# Patient Record
Sex: FEMALE | Race: ASIAN | NOT HISPANIC OR LATINO | URBAN - METROPOLITAN AREA
[De-identification: names, ages, dates, MRNs, and addresses within clinical notes are randomized per-mention and may not be internally consistent; named-entity substitution may affect disease eponyms.]

---

## 2017-02-22 ENCOUNTER — TRANSFERRED RECORDS (OUTPATIENT)
Dept: HEALTH INFORMATION MANAGEMENT | Facility: CLINIC | Age: 67
End: 2017-02-22

## 2017-03-10 ENCOUNTER — TRANSFERRED RECORDS (OUTPATIENT)
Dept: HEALTH INFORMATION MANAGEMENT | Facility: CLINIC | Age: 67
End: 2017-03-10

## 2017-03-18 ENCOUNTER — TRANSFERRED RECORDS (OUTPATIENT)
Dept: HEALTH INFORMATION MANAGEMENT | Facility: CLINIC | Age: 67
End: 2017-03-18

## 2017-04-12 ENCOUNTER — TRANSFERRED RECORDS (OUTPATIENT)
Dept: HEALTH INFORMATION MANAGEMENT | Facility: CLINIC | Age: 67
End: 2017-04-12

## 2017-07-05 ENCOUNTER — TRANSFERRED RECORDS (OUTPATIENT)
Dept: HEALTH INFORMATION MANAGEMENT | Facility: CLINIC | Age: 67
End: 2017-07-05

## 2017-07-06 ENCOUNTER — TRANSFERRED RECORDS (OUTPATIENT)
Dept: HEALTH INFORMATION MANAGEMENT | Facility: CLINIC | Age: 67
End: 2017-07-06

## 2017-07-07 ENCOUNTER — TRANSFERRED RECORDS (OUTPATIENT)
Dept: HEALTH INFORMATION MANAGEMENT | Facility: CLINIC | Age: 67
End: 2017-07-07

## 2017-07-10 ENCOUNTER — TRANSFERRED RECORDS (OUTPATIENT)
Dept: HEALTH INFORMATION MANAGEMENT | Facility: CLINIC | Age: 67
End: 2017-07-10

## 2017-07-26 ENCOUNTER — PRE VISIT (OUTPATIENT)
Dept: RADIATION ONCOLOGY | Facility: CLINIC | Age: 67
End: 2017-07-26

## 2017-07-26 NOTE — TELEPHONE ENCOUNTER
1.  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  Referring provider: self-referred, requesting second opinion  3.  Call to patient (Yes / No - short description): no, Annemarie w/[facility] spoke to patient's daughter re: appts  4.  Previous care at / records requested from: [facility] (Annemarie faxed requests for medical records)

## 2021-03-12 ENCOUNTER — IMMUNIZATION (OUTPATIENT)
Dept: NURSING | Facility: CLINIC | Age: 71
End: 2021-03-12
Payer: COMMERCIAL

## 2021-03-12 PROCEDURE — 91300 PR COVID VAC PFIZER DIL RECON 30 MCG/0.3 ML IM: CPT

## 2021-03-12 PROCEDURE — 0001A PR COVID VAC PFIZER DIL RECON 30 MCG/0.3 ML IM: CPT

## 2021-03-12 PROCEDURE — T1013 SIGN LANG/ORAL INTERPRETER: HCPCS | Mod: GT

## 2021-04-02 ENCOUNTER — IMMUNIZATION (OUTPATIENT)
Dept: NURSING | Facility: CLINIC | Age: 71
End: 2021-04-02
Attending: FAMILY MEDICINE
Payer: COMMERCIAL

## 2021-04-02 PROCEDURE — 0002A PR COVID VAC PFIZER DIL RECON 30 MCG/0.3 ML IM: CPT

## 2021-04-02 PROCEDURE — 91300 PR COVID VAC PFIZER DIL RECON 30 MCG/0.3 ML IM: CPT

## 2021-04-02 PROCEDURE — T1013 SIGN LANG/ORAL INTERPRETER: HCPCS | Mod: GT

## 2021-04-04 ENCOUNTER — HEALTH MAINTENANCE LETTER (OUTPATIENT)
Age: 71
End: 2021-04-04

## 2021-09-19 ENCOUNTER — HEALTH MAINTENANCE LETTER (OUTPATIENT)
Age: 71
End: 2021-09-19

## 2022-05-01 ENCOUNTER — HEALTH MAINTENANCE LETTER (OUTPATIENT)
Age: 72
End: 2022-05-01

## 2022-11-21 ENCOUNTER — HEALTH MAINTENANCE LETTER (OUTPATIENT)
Age: 72
End: 2022-11-21

## 2023-04-16 ENCOUNTER — HEALTH MAINTENANCE LETTER (OUTPATIENT)
Age: 73
End: 2023-04-16

## 2023-06-02 ENCOUNTER — HEALTH MAINTENANCE LETTER (OUTPATIENT)
Age: 73
End: 2023-06-02

## 2023-07-12 DIAGNOSIS — M46.40 DISCITIS, UNSPECIFIED SPINAL REGION: Primary | ICD-10-CM

## 2023-07-12 DIAGNOSIS — B99.9 INFECTION: ICD-10-CM

## 2023-07-13 ENCOUNTER — HOME INFUSION (PRE-WILLOW HOME INFUSION) (OUTPATIENT)
Dept: PHARMACY | Facility: CLINIC | Age: 73
End: 2023-07-13
Payer: COMMERCIAL

## 2023-07-13 RX ORDER — CEFTRIAXONE 2 G/1
2 INJECTION, POWDER, FOR SOLUTION INTRAMUSCULAR; INTRAVENOUS EVERY 24 HOURS
Qty: 280 ML | Refills: 0
Start: 2023-07-13 | End: 2023-07-27

## 2023-07-13 NOTE — PROGRESS NOTES
Therapy: IV ABX (Ceftriaxone)  Insurance: [facility] [place]  Ded: $0.00  Met: $0.00    Co-Insurance: 100%  Max Out of Pocket: $0.00  Met: $0.00    The patient has coverage for IV ABX (Ceftriaxone)  and coverage should be 100%.    Please contact Intake with any questions, [phone] or In Basket pool, [facility] ([phone]).

## 2023-07-13 NOTE — PROGRESS NOTES
7/12/2023    Claudia Hamida    Received an inbasket message re: IV abx at [facility]--please see [facility] and last ID note by Dr. Wilkinson below.    Referral to home infusion made- care coordinator to evaluate and ID can enter orders once home care IV Ceftriaxone infusion arranged  Patient will also need ID follow up     7/12/2023 Note by [facility] LPN    Spoke with Ines Pavon  [phone], who is in charge of patient's care while at [facility]. [facility] only at [facility] for once daily IV ceftriaxone and is wanting to go home with home infusion services. Patient has lost weight - has hx nasopharangeal cancer, on a liquid diet and has limited food options at the [facility].      Insurance will cover home infusion through [facility]. Discussed with Dr. Hernandez briefly and will be able to put orders through [facility] system.      Dr. Hernandez, please advise if the 8/16 follow up is appropriate. May possibly be done with antibiotic then. Will notify daughter of appointment when confirmed.      [facility] p: [phone], f: [phone]         Karen Florence LPN..........7/12/2023 1:52 PM ([facility])      [facility] [11]     Ross Wilkinson MD  Physician  Specialty: Infectious Diseases     Progress Notes      Signed     Date of Service: 6/12/2023  9:08 AM     Signed         Service: Infectious Disease    Note: Follow Up Note   Date: 6/12/2023      ASSESSMENT:  1. C3-C4 Epidural abscess, S/P anterior cervical discectomy fusion on 6/7, GBS  2. Cultures- Group B Streptococcus, in process  3. Nasopharyngeal cancer hx, S/P chemo, radiation in 2019  4. Neck pain  5. Recent admission for chest pain  6. Allergies- Amoxil- hives        PLAN:     Only GBS growing from 6/8  Negative BC 6/7  IV CEFTRIAXONE is ordered under the DISCHARGE navigator tab, as are labs and FOLLOW UP     Sign off     BOOGIE Wilkinson MD     ________________________________________________________________________     Notes / labs / vitals  "reviewed.     SUBJECTIVE / INTERVAL HISTORY:  Stable.  Sleeping, I did not wake her up.    Now on CEFTRIAXONE alone.  DISCHARGE orders and medications are entered.         ROS: A complete 12 point ROS is negative except as listed above.     PMHx/FHx/SHx: Reviewed. Interval changes noted.           OBJECTIVE:  VS /90 (Cuff Size: Adult Regular)   Pulse 100   Temp 98.5  F (36.9  C)   Resp 18   Ht 1.499 m (4' 11\")   Wt 40.8 kg (90 lb)   SpO2 97%   BMI 18.18 kg/m       Temp (24hrs), Av.9  F (36.6  C), Min:96.8  F (36  C), Max:99.2  F (37.3  C)        GEN: resting.  Did not wake up  Neck stable, PICC in     Antibiotics:  CTX     Pertinent labs: reviewed              Recent Labs     23  0607 23  0011 23  2101 23  0649 06/10/23  0756 23  0603 23  1343 23  1806   WBC  --   --   --  13.7 H 16.3 H 10.9 10.6 13.0 H   HGB 13.2 13.4 13.3 13.6 13.6 14.1 13.3 12.0   HCT  --   --   --  40.0 40.8 44.2 42.1 36.8   PLT  --   --   --  255 278 263 274 168                 Recent Labs     23  0607 23  0649 06/10/23  0756 23  0603 23  1343   CREATININE 0.47 L 0.51 0.52 0.66 0.83             Recent Labs     06/10/23  0756   ALT 11   AST 37 H   BILITOTAL 0.5   ALKPHOSPH 94         MICROBIOLOGY DATA:  Reviewed culture results- Group B Strep  Reviewed aerobic, anaerobic and blood culture results             Microbiology Results (72 HOURS)      Procedure Component Value Units Date/Time     ANAEROBIC CULTURE [[id number]] Collected: 23 1314     Lab Status: In process Specimen: Cervical Swab Updated: 23 1437     AEROBIC BACTERIAL CULTURE, STAIN [[id number]] Collected: 23 1314     Lab Status: Preliminary result Specimen: Cervical Swab Updated: 23 1811       GRAM STAIN                1+ PMNs         No Epithelial cells         2+ RBCs         No organisms seen         Gram stain performed by [facility], [place]     BLOOD CULTURE " [[id number]]  (Normal) Collected: 06/07/23 2202     Lab Status: Preliminary result Specimen: Blood Updated: 06/08/23 0206       CULTURE No growth to date.     BLOOD CULTURE [[id number]]  (Normal) Collected: 06/07/23 2201     Lab Status: Preliminary result Specimen: Blood Updated: 06/08/23 0206       CULTURE No growth to date.             IMAGING/RADIOLOGY:  Reviewed imaging results  XR C-ARM GREATER 1 HR     Result Date: 6/8/2023  For Patients: As a result of the 21st Century Cures Act, medical imaging exams and procedure reports are released immediately into your electronic medical record. You may view this report before your referring provider. If you have questions, please contact your health care provider. EXAM: XR C-ARM GREATER 1 HR LOCATION: [facility] MEDICAL IMAGING DATE/TIME: 6/8/2023 1:46 PM CDT INDICATION: Image guided surgery COMPARISON: Cervical spine CT 06/07/2023 TECHNIQUE: Intraoperative fluoroscopy performed during the patient's procedure. FLUOROSCOPIC TIME: 20 sec 30 sec NUMBER OF IMAGES: 2 FINDINGS: Interval C3-C4 anterior cervical discectomy and fusion. Instrumentation is in good position. Anatomic alignment.     MR C-Spine W/WO Contrast if Prvious Surg     Result Date: 6/7/2023  For Patients: As a result of the 21st Century Cures Act, medical imaging exams and procedure reports are released immediately into your electronic medical record. You may view this report before your referring provider. If you have questions, please contact your health care provider. EXAM: MR SPINE CERVICAL WWO LOCATION: [facility] MEDICAL IMAGING DATE/TIME: 6/7/2023 9:20 PM CDT INDICATION: neck pain, see CT findings, hx of facial Cancer COMPARISON: None. CONTRAST: Gadavist 4 TECHNIQUE: MRI Cervical Spine without and with IV contrast. FINDINGS: Normal craniocervical and atlantoaxial alignment. Straightening of cervical lordosis. Marrow T2 STIR hyperintensity and enhancement throughout the C3 and C4 vertebral bodies. T2 STIR  hyperintensity and enhancement within the posterior intervertebral disc. Rim-enhancing fluid collection in the ventral epidural space measures 19 x 7 mm. Right lateral and dorsolateral epidural thickening extends cephalad to the craniocervical junction and caudally to the level of C5. The abscess compresses the cervical spinal cord at the level of C3-C4 with the spinal canal measuring as narrow as 6 mm. Abnormal cord T2 hyperintensity extends from the level of C1-C2 level of C5. At the level of C1-C2 there is abnormal T2 hyperintensity in the central and right dorsolateral cord left dorsolateral cord T2 hyperintensity is seen at the level of C2-C3 through the level of C4-C5. At the level of C5, central and midline dorsal cord T2 hyperintensity. No abnormal cord enhancement. T2 STIR hyperintensity and enhancement in the prevertebral and retropharyngeal space anterior to the discitis osteomyelitis is favored to represent phlegmon/effusion. No rim-enhancing collection to suggest prevertebral/retropharyngeal abscess. Multilevel cervical spondylosis. At the level of C4-C5, central disc protrusion and ventral epidural phlegmon contribute to moderate to severe spinal canal stenosis. Central disc protrusion at C6-C7 flattens the ventral spinal cord with faint ventral cord T2 hyperintensity and only mild spinal canal stenosis. No additional significant spinal canal stenosis. No high-grade neural foraminal stenosis.      1.  C3-C4 discitis-osteomyelitis with associated ventral epidural abscess that causes severe spinal canal stenosis compresses the ventral spinal cord. Abnormal cord signal (6 without abnormal cord enhancement) extends from the level of C1-C2 through the level of C5. Recommend neurosurgical consultation. 2.  Probable retropharyngeal effusion and prevertebral phlegmon anterior to the level of discitis-osteomyelitis without finding to suggest retropharyngeal/prevertebral abscess. 3.  No additional high-grade  spinal canal or neural foraminal stenosis. 4.  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      CT NECK SOFT TISSUE W     Result Date: 6/7/2023  For Patients: As a result of the 21st Century Cures Act, medical imaging exams and procedure reports are released immediately into your electronic medical record. You may view this report before your referring provider. If you have questions, please contact your health care provider. EXAM: CT NECK SOFT TISSUE W LOCATION: [facility] MEDICAL IMAGING DATE/TIME: 6/7/2023 5:40 PM CDT INDICATION: neck pain, swelling ,hx of CA COMPARISON: 04/20/2020 CONTRAST: IOHEXOL 350 MG IODINE/ML  ML BOTTLE: 100mL TECHNIQUE: Routine CT Soft Tissue Neck with IV contrast. Multiplanar reformats. Dose reduction techniques were used. FINDINGS: MUCOSAL SPACES/SOFT TISSUES: Slight asymmetry in the appearance of nasopharyngeal tissues with likely prominent and tissues on the right (series 3 image 25). This is similar to prior exam, allowing for differences in technique (series 3 image 19 on the prior). No definite other asymmetry.. Normal vocal cords and infraglottic trachea. Normal parapharyngeal space and subcutaneous soft tissues. Normal oral cavity,  spaces, and floor of mouth structures. LYMPH NODES: No pathologic lymph nodes by size or morphology criteria. SALIVARY GLANDS: Fatty replacement of parotid glands. Mild inflammatory changes in submandibular space, nonspecific. Submandibular glands appear normal. THYROID: Normal. VESSELS: Vascular structures of the neck are patent. VISUALIZED INTRACRANIAL/ORBITS/SINUSES: No abnormality of the visualized intracranial compartment or orbits. Moderate mucosal thickening maxillary sinuses with underlying postoperative changes. Moderate mucosal thickening sphenoid sinuses. Fluid right maxillary and  right sphenoid sinus. Opacification mastoid air cells and middle ear cavities bilaterally. OTHER: Epidural low-density surrounding C3-C4 intervertebral space measuring 6 mm in thickness and extending from C2-C3 through C4-C5 level. It creates moderate to marked canal narrowing with likely mass effect on the cord. This probably represents disc extrusion however epidural collection cannot be excluded. Is not well-visualized with CT soft tissue neck. MRI cervical spine without and with contrast recommended. The included lung apices are clear.      1.  Epidural low-density surrounding C3-C4 intervertebral space measuring 6 mm in thickness and extending from C2-C3 through C4-C5 level. It creates moderate to marked canal narrowing with likely mass effect on the cord. This probably represents disc extrusion however epidural collection cannot be excluded. Is not well-visualized with CT soft tissue neck. MRI cervical spine without and with contrast recommended. 2.  Slight asymmetry in the appearance of nasopharyngeal tissues with likely prominent and tissues on the right. This is similar to prior exam, allowing for differences in technique. 3.  No definite other asymmetry in soft tissues throughout the neck. 4.  Mild inflammatory changes in submandibular space, nonspecific. Submandibular glands appear normal. 5.  Moderate mucosal thickening maxillary sinuses with underlying postoperative changes. Moderate mucosal thickening sphenoid sinuses. Fluid right maxillary and right sphenoid sinus. 6.  Opacification mastoid air cells and middle ear cavities bilaterally.      CT SPINE CERVICAL WO     Result Date: 6/7/2023  For Patients: As a result of the 21st Century Cures Act, medical imaging exams and procedure reports are released immediately into your electronic medical record. You may view this report before your referring provider. If you have questions, please contact your health care provider. EXAM: CT SPINE CERVICAL WO  LOCATION: [facility] MEDICAL IMAGING DATE/TIME: 6/7/2023 3:55 PM CDT INDICATION: Diffuse posterior neck pain. Lymphoepthelial. Carcinoma. COMPARISON: None. TECHNIQUE: Routine CT Cervical Spine without IV contrast. Multiplanar reformats. Dose reduction techniques were used. FINDINGS: VERTEBRA: Normal vertebral body heights. Reversed lordosis. No fracture or post traumatic subluxation. Incidental C2 intraosseous hemangioma. CANAL/FORAMINA: Moderate right C5-C6 neural foraminal stenoses due to chronic degeneration. No spinal canal stenosis. PARASPINAL: Diffuse loss of prevertebral and deep cervical fat planes . Edematous appearance of the epiglottis. Changes may be from prior radiation therapy.      1.  No fracture or posttraumatic subluxation. 2.  Moderate right C5-C6 neural foraminal stenoses due to chronic degeneration. 3.  Diffuse loss of prevertebral and deep cervical fat planes and edematous appearance of the epiglottis. Changes may be from prior radiation therapy. 4.  Consider further assessment with a contrast-enhanced soft tissue neck CT if there are clinical concerns for an active soft tissue process.     XR CHEST 2 VIEWS PA AND LATERAL     Result Date: 5/22/2023  For Patients: As a result of the 21st Century Cures Act, medical imaging exams and procedure reports are released immediately into your electronic medical record. You may view this report before your referring provider. If you have questions, please contact your health care provider. EXAM: XR CHEST 2 VIEWS PA AND LATERAL LOCATION: [facility] MEDICAL IMAGING DATE/TIME: 5/22/2023 5:04 PM CDT INDICATION: Chest pain SOB COMPARISON: None.      Chronic biapical scarring. Lungs are otherwise clear. No pleural effusion. Heart size and pulmonary vascularity within normal limits. Bones are demineralized.     DIAGNOSES  []? Minor/self-limited problem  []? Stable chronic illness  []? Acute uncomplicated illness/injury   []? Chronic illness with exacerbation/progression  []?  New problem with uncertain prognosis  [x]? Acute systemic illness  []? Problem with uncertain diagnosis  []? Chronic illness with severe exacerbation/progression  [x]? Problem that poses threat to life or bodily function      REVIEW OF DATA   I have  []? Reviewed/ordered []? 1 []? 2 [x]? ? 3 unique laboratory, radiology, and/or diagnostic tests     []? Reviewed []? 1 []? 2 [x]? ? 3 prior external notes and incorporated into patient assessment      [x]? Discussed management or test interpretation with external provider(s) on 06/12/23     RISK OF COMPLICATION   This writer has deemed the above diagnoses to have a risk of complication, morbidity or mortality of:   []? Minimal  []? Low  []? Moderate   []?Due to prescription drug management   []?Decision regarding minor surgery   []?Diagnosis or treatment limited by social determinants of health  [x]? Severe    [x]?Due to intensive monitoring for therapy toxicity   []?Decision elective surgery with risk factors  [x]?Decision regarding need for hospitalization  []?Advanced Care Planning decisions     I spent 50 minutes counseling and coordinating care for the items checked below on the day of service  [x]? Preparing to see the patient (e.g., review of tests)  [x]? Obtaining and or reviewing separately obtained history   [x]? Performing a medically appropriate examination and/or evaluation   [x]? Counseling and educating the patient/family/caregiver   [x]? Ordering medications, tests, or procedures  []? Referring and communicating with other healthcare professionals (when not separately reported)  [x]? Documenting clinical information in the EHR  [x]? Independently interpreting results and communicating results to patient/ family/caregiver

## 2023-07-20 ENCOUNTER — LAB REQUISITION (OUTPATIENT)
Dept: LAB | Facility: CLINIC | Age: 73
End: 2023-07-20
Payer: COMMERCIAL

## 2023-07-20 DIAGNOSIS — G06.1 INTRASPINAL ABSCESS AND GRANULOMA: ICD-10-CM

## 2023-07-20 LAB
ALBUMIN SERPL BCG-MCNC: 3.8 G/DL (ref 3.5–5.2)
ALP SERPL-CCNC: 121 U/L (ref 35–104)
ALT SERPL W P-5'-P-CCNC: 9 U/L (ref 0–50)
AST SERPL W P-5'-P-CCNC: 29 U/L (ref 0–45)
BASOPHILS # BLD AUTO: 0 10E3/UL (ref 0–0.2)
BASOPHILS NFR BLD AUTO: 0 %
BILIRUB DIRECT SERPL-MCNC: <0.2 MG/DL (ref 0–0.3)
BILIRUB SERPL-MCNC: 0.5 MG/DL
CREAT SERPL-MCNC: 0.88 MG/DL (ref 0.51–0.95)
CRP SERPL-MCNC: 70.5 MG/L
EOSINOPHIL # BLD AUTO: 0 10E3/UL (ref 0–0.7)
EOSINOPHIL NFR BLD AUTO: 1 %
ERYTHROCYTE [DISTWIDTH] IN BLOOD BY AUTOMATED COUNT: 16.2 % (ref 10–15)
GFR SERPL CREATININE-BSD FRML MDRD: 70 ML/MIN/1.73M2
HCT VFR BLD AUTO: 38.7 % (ref 35–47)
HGB BLD-MCNC: 12 G/DL (ref 11.7–15.7)
IMM GRANULOCYTES # BLD: 0 10E3/UL
IMM GRANULOCYTES NFR BLD: 0 %
LYMPHOCYTES # BLD AUTO: 0.5 10E3/UL (ref 0.8–5.3)
LYMPHOCYTES NFR BLD AUTO: 9 %
MCH RBC QN AUTO: 28.8 PG (ref 26.5–33)
MCHC RBC AUTO-ENTMCNC: 31 G/DL (ref 31.5–36.5)
MCV RBC AUTO: 93 FL (ref 78–100)
MONOCYTES # BLD AUTO: 0.6 10E3/UL (ref 0–1.3)
MONOCYTES NFR BLD AUTO: 10 %
NEUTROPHILS # BLD AUTO: 4.9 10E3/UL (ref 1.6–8.3)
NEUTROPHILS NFR BLD AUTO: 80 %
NRBC # BLD AUTO: 0 10E3/UL
NRBC BLD AUTO-RTO: 0 /100
PLATELET # BLD AUTO: 165 10E3/UL (ref 150–450)
PROT SERPL-MCNC: 7.2 G/DL (ref 6.4–8.3)
RBC # BLD AUTO: 4.16 10E6/UL (ref 3.8–5.2)
WBC # BLD AUTO: 6 10E3/UL (ref 4–11)

## 2023-07-20 PROCEDURE — 86140 C-REACTIVE PROTEIN: CPT | Performed by: INTERNAL MEDICINE

## 2023-07-20 PROCEDURE — 82565 ASSAY OF CREATININE: CPT | Performed by: INTERNAL MEDICINE

## 2023-07-20 PROCEDURE — 80076 HEPATIC FUNCTION PANEL: CPT | Performed by: INTERNAL MEDICINE

## 2023-07-20 PROCEDURE — 85025 COMPLETE CBC W/AUTO DIFF WBC: CPT | Performed by: INTERNAL MEDICINE

## 2023-07-27 ENCOUNTER — LAB REQUISITION (OUTPATIENT)
Dept: LAB | Facility: CLINIC | Age: 73
End: 2023-07-27
Payer: COMMERCIAL

## 2023-07-27 DIAGNOSIS — G06.1 INTRASPINAL ABSCESS AND GRANULOMA: ICD-10-CM

## 2023-07-27 LAB
ALBUMIN SERPL BCG-MCNC: 4 G/DL (ref 3.5–5.2)
ALP SERPL-CCNC: 92 U/L (ref 35–104)
ALT SERPL W P-5'-P-CCNC: <5 U/L (ref 0–50)
AST SERPL W P-5'-P-CCNC: 31 U/L (ref 0–45)
BASOPHILS # BLD AUTO: 0 10E3/UL (ref 0–0.2)
BASOPHILS NFR BLD AUTO: 1 %
BILIRUB DIRECT SERPL-MCNC: <0.2 MG/DL (ref 0–0.3)
BILIRUB SERPL-MCNC: 0.3 MG/DL
CREAT SERPL-MCNC: 0.82 MG/DL (ref 0.51–0.95)
CRP SERPL-MCNC: 8.1 MG/L
EOSINOPHIL # BLD AUTO: 0.1 10E3/UL (ref 0–0.7)
EOSINOPHIL NFR BLD AUTO: 2 %
ERYTHROCYTE [DISTWIDTH] IN BLOOD BY AUTOMATED COUNT: 16.4 % (ref 10–15)
FASTING STATUS PATIENT QL REPORTED: NORMAL
GFR SERPL CREATININE-BSD FRML MDRD: 77 ML/MIN/1.73M2
HCT VFR BLD AUTO: 37.7 % (ref 35–47)
HGB BLD-MCNC: 11.9 G/DL (ref 11.7–15.7)
IMM GRANULOCYTES # BLD: 0 10E3/UL
IMM GRANULOCYTES NFR BLD: 0 %
LYMPHOCYTES # BLD AUTO: 0.7 10E3/UL (ref 0.8–5.3)
LYMPHOCYTES NFR BLD AUTO: 12 %
MCH RBC QN AUTO: 29.5 PG (ref 26.5–33)
MCHC RBC AUTO-ENTMCNC: 31.6 G/DL (ref 31.5–36.5)
MCV RBC AUTO: 94 FL (ref 78–100)
MONOCYTES # BLD AUTO: 0.3 10E3/UL (ref 0–1.3)
MONOCYTES NFR BLD AUTO: 5 %
NEUTROPHILS # BLD AUTO: 4.5 10E3/UL (ref 1.6–8.3)
NEUTROPHILS NFR BLD AUTO: 80 %
NRBC # BLD AUTO: 0 10E3/UL
NRBC BLD AUTO-RTO: 0 /100
PLATELET # BLD AUTO: 204 10E3/UL (ref 150–450)
PROT SERPL-MCNC: 7.1 G/DL (ref 6.4–8.3)
RBC # BLD AUTO: 4.03 10E6/UL (ref 3.8–5.2)
TRIGL SERPL-MCNC: 149 MG/DL
WBC # BLD AUTO: 5.5 10E3/UL (ref 4–11)

## 2023-07-27 PROCEDURE — 86140 C-REACTIVE PROTEIN: CPT | Performed by: INTERNAL MEDICINE

## 2023-07-27 PROCEDURE — 84478 ASSAY OF TRIGLYCERIDES: CPT | Performed by: INTERNAL MEDICINE

## 2023-07-27 PROCEDURE — 85049 AUTOMATED PLATELET COUNT: CPT | Performed by: INTERNAL MEDICINE

## 2023-07-27 PROCEDURE — 82565 ASSAY OF CREATININE: CPT | Performed by: INTERNAL MEDICINE

## 2023-07-27 PROCEDURE — 80076 HEPATIC FUNCTION PANEL: CPT | Performed by: INTERNAL MEDICINE

## 2023-08-03 ENCOUNTER — LAB REQUISITION (OUTPATIENT)
Dept: LAB | Facility: CLINIC | Age: 73
End: 2023-08-03
Payer: COMMERCIAL

## 2023-08-03 DIAGNOSIS — G06.1 INTRASPINAL ABSCESS AND GRANULOMA: ICD-10-CM

## 2023-08-03 LAB
ALBUMIN SERPL BCG-MCNC: 4 G/DL (ref 3.5–5.2)
ALP SERPL-CCNC: 87 U/L (ref 35–104)
ALT SERPL W P-5'-P-CCNC: 9 U/L (ref 0–50)
AST SERPL W P-5'-P-CCNC: 34 U/L (ref 0–45)
BASOPHILS # BLD AUTO: 0 10E3/UL (ref 0–0.2)
BASOPHILS NFR BLD AUTO: 1 %
BILIRUB DIRECT SERPL-MCNC: <0.2 MG/DL (ref 0–0.3)
BILIRUB SERPL-MCNC: 0.2 MG/DL
CREAT SERPL-MCNC: 0.77 MG/DL (ref 0.51–0.95)
CRP SERPL-MCNC: 5.55 MG/L
EOSINOPHIL # BLD AUTO: 0.1 10E3/UL (ref 0–0.7)
EOSINOPHIL NFR BLD AUTO: 2 %
ERYTHROCYTE [DISTWIDTH] IN BLOOD BY AUTOMATED COUNT: 16.7 % (ref 10–15)
GFR SERPL CREATININE-BSD FRML MDRD: 83 ML/MIN/1.73M2
HCT VFR BLD AUTO: 39.1 % (ref 35–47)
HGB BLD-MCNC: 12.1 G/DL (ref 11.7–15.7)
IMM GRANULOCYTES # BLD: 0 10E3/UL
IMM GRANULOCYTES NFR BLD: 0 %
LYMPHOCYTES # BLD AUTO: 0.6 10E3/UL (ref 0.8–5.3)
LYMPHOCYTES NFR BLD AUTO: 10 %
MCH RBC QN AUTO: 29.4 PG (ref 26.5–33)
MCHC RBC AUTO-ENTMCNC: 30.9 G/DL (ref 31.5–36.5)
MCV RBC AUTO: 95 FL (ref 78–100)
MONOCYTES # BLD AUTO: 0.3 10E3/UL (ref 0–1.3)
MONOCYTES NFR BLD AUTO: 5 %
NEUTROPHILS # BLD AUTO: 4.9 10E3/UL (ref 1.6–8.3)
NEUTROPHILS NFR BLD AUTO: 82 %
NRBC # BLD AUTO: 0 10E3/UL
NRBC BLD AUTO-RTO: 0 /100
PLATELET # BLD AUTO: 203 10E3/UL (ref 150–450)
PROT SERPL-MCNC: 6.9 G/DL (ref 6.4–8.3)
RBC # BLD AUTO: 4.11 10E6/UL (ref 3.8–5.2)
WBC # BLD AUTO: 6 10E3/UL (ref 4–11)

## 2023-08-03 PROCEDURE — 80076 HEPATIC FUNCTION PANEL: CPT | Performed by: INTERNAL MEDICINE

## 2023-08-03 PROCEDURE — 82565 ASSAY OF CREATININE: CPT | Performed by: INTERNAL MEDICINE

## 2023-08-03 PROCEDURE — 86140 C-REACTIVE PROTEIN: CPT | Performed by: INTERNAL MEDICINE

## 2023-08-03 PROCEDURE — 85041 AUTOMATED RBC COUNT: CPT | Performed by: INTERNAL MEDICINE

## 2023-08-03 PROCEDURE — 85014 HEMATOCRIT: CPT | Performed by: INTERNAL MEDICINE

## 2023-08-04 ENCOUNTER — APPOINTMENT (OUTPATIENT)
Dept: INTERPRETER SERVICES | Facility: CLINIC | Age: 73
End: 2023-08-04
Payer: COMMERCIAL

## 2023-08-10 ENCOUNTER — LAB REQUISITION (OUTPATIENT)
Dept: LAB | Facility: CLINIC | Age: 73
End: 2023-08-10
Payer: COMMERCIAL

## 2023-08-10 DIAGNOSIS — G06.1 INTRASPINAL ABSCESS AND GRANULOMA: ICD-10-CM

## 2023-08-10 LAB
ALBUMIN SERPL BCG-MCNC: 4.1 G/DL (ref 3.5–5.2)
ALP SERPL-CCNC: 87 U/L (ref 35–104)
ALT SERPL W P-5'-P-CCNC: 10 U/L (ref 0–50)
AST SERPL W P-5'-P-CCNC: 38 U/L (ref 0–45)
BASOPHILS # BLD AUTO: 0 10E3/UL (ref 0–0.2)
BASOPHILS NFR BLD AUTO: 1 %
BILIRUB DIRECT SERPL-MCNC: <0.2 MG/DL (ref 0–0.3)
BILIRUB SERPL-MCNC: 0.2 MG/DL
CREAT SERPL-MCNC: 0.85 MG/DL (ref 0.51–0.95)
CRP SERPL-MCNC: <3 MG/L
EOSINOPHIL # BLD AUTO: 0.1 10E3/UL (ref 0–0.7)
EOSINOPHIL NFR BLD AUTO: 2 %
ERYTHROCYTE [DISTWIDTH] IN BLOOD BY AUTOMATED COUNT: 16.9 % (ref 10–15)
GFR SERPL CREATININE-BSD FRML MDRD: 73 ML/MIN/1.73M2
HCT VFR BLD AUTO: 38.9 % (ref 35–47)
HGB BLD-MCNC: 12.4 G/DL (ref 11.7–15.7)
HOLD SPECIMEN: NORMAL
IMM GRANULOCYTES # BLD: 0 10E3/UL
IMM GRANULOCYTES NFR BLD: 0 %
LYMPHOCYTES # BLD AUTO: 0.6 10E3/UL (ref 0.8–5.3)
LYMPHOCYTES NFR BLD AUTO: 12 %
MCH RBC QN AUTO: 29.9 PG (ref 26.5–33)
MCHC RBC AUTO-ENTMCNC: 31.9 G/DL (ref 31.5–36.5)
MCV RBC AUTO: 94 FL (ref 78–100)
MONOCYTES # BLD AUTO: 0.3 10E3/UL (ref 0–1.3)
MONOCYTES NFR BLD AUTO: 6 %
NEUTROPHILS # BLD AUTO: 3.8 10E3/UL (ref 1.6–8.3)
NEUTROPHILS NFR BLD AUTO: 79 %
NRBC # BLD AUTO: 0 10E3/UL
NRBC BLD AUTO-RTO: 0 /100
PLATELET # BLD AUTO: 186 10E3/UL (ref 150–450)
PROT SERPL-MCNC: 6.9 G/DL (ref 6.4–8.3)
RBC # BLD AUTO: 4.15 10E6/UL (ref 3.8–5.2)
WBC # BLD AUTO: 4.8 10E3/UL (ref 4–11)

## 2023-08-10 PROCEDURE — 86140 C-REACTIVE PROTEIN: CPT | Performed by: INTERNAL MEDICINE

## 2023-08-10 PROCEDURE — 80076 HEPATIC FUNCTION PANEL: CPT | Performed by: INTERNAL MEDICINE

## 2023-08-10 PROCEDURE — 82565 ASSAY OF CREATININE: CPT | Performed by: INTERNAL MEDICINE

## 2023-08-10 PROCEDURE — 85025 COMPLETE CBC W/AUTO DIFF WBC: CPT | Performed by: INTERNAL MEDICINE

## 2023-08-17 ENCOUNTER — LAB REQUISITION (OUTPATIENT)
Dept: LAB | Facility: CLINIC | Age: 73
End: 2023-08-17
Payer: COMMERCIAL

## 2023-08-17 DIAGNOSIS — G06.1 INTRASPINAL ABSCESS AND GRANULOMA: ICD-10-CM

## 2023-08-17 LAB
ALBUMIN SERPL BCG-MCNC: 4.1 G/DL (ref 3.5–5.2)
ALP SERPL-CCNC: 89 U/L (ref 35–104)
ALT SERPL W P-5'-P-CCNC: 8 U/L (ref 0–50)
AST SERPL W P-5'-P-CCNC: 36 U/L (ref 0–45)
BASOPHILS # BLD AUTO: 0 10E3/UL (ref 0–0.2)
BASOPHILS NFR BLD AUTO: 1 %
BILIRUB DIRECT SERPL-MCNC: <0.2 MG/DL (ref 0–0.3)
BILIRUB SERPL-MCNC: 0.2 MG/DL
CREAT SERPL-MCNC: 0.81 MG/DL (ref 0.51–0.95)
CRP SERPL-MCNC: 4.45 MG/L
EOSINOPHIL # BLD AUTO: 0.2 10E3/UL (ref 0–0.7)
EOSINOPHIL NFR BLD AUTO: 4 %
ERYTHROCYTE [DISTWIDTH] IN BLOOD BY AUTOMATED COUNT: 16.7 % (ref 10–15)
GFR SERPL CREATININE-BSD FRML MDRD: 77 ML/MIN/1.73M2
HCT VFR BLD AUTO: 39 % (ref 35–47)
HGB BLD-MCNC: 12.2 G/DL (ref 11.7–15.7)
HOLD SPECIMEN: NORMAL
IMM GRANULOCYTES # BLD: 0.1 10E3/UL
IMM GRANULOCYTES NFR BLD: 1 %
LYMPHOCYTES # BLD AUTO: 0.6 10E3/UL (ref 0.8–5.3)
LYMPHOCYTES NFR BLD AUTO: 12 %
MCH RBC QN AUTO: 30 PG (ref 26.5–33)
MCHC RBC AUTO-ENTMCNC: 31.3 G/DL (ref 31.5–36.5)
MCV RBC AUTO: 96 FL (ref 78–100)
MONOCYTES # BLD AUTO: 0.2 10E3/UL (ref 0–1.3)
MONOCYTES NFR BLD AUTO: 5 %
NEUTROPHILS # BLD AUTO: 3.9 10E3/UL (ref 1.6–8.3)
NEUTROPHILS NFR BLD AUTO: 77 %
NRBC # BLD AUTO: 0 10E3/UL
NRBC BLD AUTO-RTO: 0 /100
PLATELET # BLD AUTO: 153 10E3/UL (ref 150–450)
PROT SERPL-MCNC: 6.9 G/DL (ref 6.4–8.3)
RBC # BLD AUTO: 4.07 10E6/UL (ref 3.8–5.2)
WBC # BLD AUTO: 5 10E3/UL (ref 4–11)

## 2023-08-17 PROCEDURE — 86140 C-REACTIVE PROTEIN: CPT | Performed by: INTERNAL MEDICINE

## 2023-08-17 PROCEDURE — 82040 ASSAY OF SERUM ALBUMIN: CPT | Performed by: INTERNAL MEDICINE

## 2023-08-17 PROCEDURE — 82565 ASSAY OF CREATININE: CPT | Performed by: INTERNAL MEDICINE

## 2023-08-17 PROCEDURE — 85025 COMPLETE CBC W/AUTO DIFF WBC: CPT | Performed by: INTERNAL MEDICINE

## 2023-08-25 ENCOUNTER — LAB REQUISITION (OUTPATIENT)
Dept: LAB | Facility: CLINIC | Age: 73
End: 2023-08-25
Payer: COMMERCIAL

## 2023-08-25 DIAGNOSIS — G06.1 INTRASPINAL ABSCESS AND GRANULOMA: ICD-10-CM

## 2023-08-25 LAB
ALBUMIN SERPL BCG-MCNC: 4.1 G/DL (ref 3.5–5.2)
ALP SERPL-CCNC: 90 U/L (ref 35–104)
ALT SERPL W P-5'-P-CCNC: 9 U/L (ref 0–50)
AST SERPL W P-5'-P-CCNC: 36 U/L (ref 0–45)
BASOPHILS # BLD AUTO: 0 10E3/UL (ref 0–0.2)
BASOPHILS NFR BLD AUTO: 1 %
BILIRUB DIRECT SERPL-MCNC: <0.2 MG/DL (ref 0–0.3)
BILIRUB SERPL-MCNC: 0.2 MG/DL
CREAT SERPL-MCNC: 0.81 MG/DL (ref 0.51–0.95)
CRP SERPL-MCNC: <3 MG/L
EOSINOPHIL # BLD AUTO: 0.1 10E3/UL (ref 0–0.7)
EOSINOPHIL NFR BLD AUTO: 3 %
ERYTHROCYTE [DISTWIDTH] IN BLOOD BY AUTOMATED COUNT: 16.2 % (ref 10–15)
GFR SERPL CREATININE-BSD FRML MDRD: 77 ML/MIN/1.73M2
HCT VFR BLD AUTO: 38.8 % (ref 35–47)
HGB BLD-MCNC: 12.3 G/DL (ref 11.7–15.7)
HOLD SPECIMEN: NORMAL
IMM GRANULOCYTES # BLD: 0 10E3/UL
IMM GRANULOCYTES NFR BLD: 0 %
LYMPHOCYTES # BLD AUTO: 0.5 10E3/UL (ref 0.8–5.3)
LYMPHOCYTES NFR BLD AUTO: 12 %
MCH RBC QN AUTO: 30.1 PG (ref 26.5–33)
MCHC RBC AUTO-ENTMCNC: 31.7 G/DL (ref 31.5–36.5)
MCV RBC AUTO: 95 FL (ref 78–100)
MONOCYTES # BLD AUTO: 0.3 10E3/UL (ref 0–1.3)
MONOCYTES NFR BLD AUTO: 6 %
NEUTROPHILS # BLD AUTO: 3.2 10E3/UL (ref 1.6–8.3)
NEUTROPHILS NFR BLD AUTO: 78 %
NRBC # BLD AUTO: 0 10E3/UL
NRBC BLD AUTO-RTO: 0 /100
PLATELET # BLD AUTO: 153 10E3/UL (ref 150–450)
PROT SERPL-MCNC: 6.9 G/DL (ref 6.4–8.3)
RBC # BLD AUTO: 4.08 10E6/UL (ref 3.8–5.2)
WBC # BLD AUTO: 4.1 10E3/UL (ref 4–11)

## 2023-08-25 PROCEDURE — 85025 COMPLETE CBC W/AUTO DIFF WBC: CPT | Performed by: INTERNAL MEDICINE

## 2023-08-25 PROCEDURE — 80076 HEPATIC FUNCTION PANEL: CPT | Performed by: INTERNAL MEDICINE

## 2023-08-25 PROCEDURE — 82565 ASSAY OF CREATININE: CPT | Performed by: INTERNAL MEDICINE

## 2023-08-25 PROCEDURE — 86140 C-REACTIVE PROTEIN: CPT | Performed by: INTERNAL MEDICINE

## 2023-09-18 ENCOUNTER — TRANSCRIBE ORDERS (OUTPATIENT)
Dept: OTHER | Age: 73
End: 2023-09-18

## 2023-09-18 DIAGNOSIS — Z85.819 HISTORY OF NASOPHARYNGEAL CANCER: Primary | ICD-10-CM

## 2023-09-18 DIAGNOSIS — R49.0 VOICE HOARSENESS: ICD-10-CM

## 2023-10-26 NOTE — TELEPHONE ENCOUNTER
FUTURE VISIT INFORMATION      FUTURE VISIT INFORMATION:  Date: 12/13/23  Time: 9:40am  Location: [facility] [place]  REFERRAL INFORMATION:  Referring provider:  Quinn Joseph MD   Referring providers clinic:  [facility] [place] [facility]  Reason for visit/diagnosis  Ref by Quinn Joseph MD for History of Nasopharyngeal Cancer and Voice Hoarseness     RECORDS REQUESTED FROM:       Clinic name Comments Records Status Imaging Status   [facility] [place] [facility]  10/25/23, 9/13/23, 5/22/23 - OV Quinn Joseph MD     MORE IN CE CE     [facility]  6/11/23- XR CHEST   6/9/23- XR VIDEO SWALLOW   6/7/23- CT NECK     OFFICE VISIT:  5/22/23- ED   CE 10/26/23- PENDING    [facility]  1/24/23- XR VIDEO SWALLOW   12/7/21- CT HEAD   12/6/21- XR CHEST   4/20/20 - CT NECK -PACS  4/20/20- CT CHEST -PACS  3/25/19- MRI BRAIN - PACS    PROCEDURE:  4/23/20- EGD W. BIOPSY  9/11/19- OV Jaime Pal MD  CE  10/26/23- PENDING    [facility]  [address], [address] 9/11/19- Case: [id number] - Sinus Contents, left   8/8/16- Case: [id number]  CE    [place] [facility]  11/6/19- MR FACE  11/6/19- PET-   4/10/19- MR FACE-   9/26/18- PET -  6/11/18- MR FACE-  3/7/18- MRI FACE NECK  11/28/17- MRI FACE NECK   10/24/17- CT MAXILLOFACIAL   10/9/17- MR BRAIN   8/24/17- MRI FACE NECK  7/6/17- PET  3/10/17- CT NECK   2/22/17- CT SINUS   2/22/17- CT HEAD   9/1/16- PET   5/31/16- CT TEMPORAL  CE 10/26/23- pending     PACS   [place]  2/6/20- ov wPriya Amezcua, Yeow, Mei E and Dominique Triplett   11/6/19- ov wSai Conway   7/23/19- Ov Griselda Saravia in Epic  CE      October 26, 2023 8:15 AM - Faxed a request to [place], [place], and [place] to push Image to [place] PACS- Rosario   October 30, 2023 8:52 AM - Received image from [place] in pacs and attached it to patient- Rosario

## 2023-11-06 ENCOUNTER — TELEPHONE (OUTPATIENT)
Dept: OTOLARYNGOLOGY | Facility: CLINIC | Age: 73
End: 2023-11-06
Payer: COMMERCIAL

## 2023-11-06 NOTE — TELEPHONE ENCOUNTER
"\"We do not accept transfers for cancer surveillance if they were treated on the outside. They need to follow up with treating clinic (which would be [facility]). Patient can continue to follow up with [facility] for hearing loss and hearing aids. \" Per Kiersten HYMAN    Left  on home /cell phone for CB.    Josefina Whiting LPN    "

## 2023-11-07 NOTE — TELEPHONE ENCOUNTER
"\"Referred by:  Dr. Quinn Joseph  [facility]  [address] [place] [address]  Phone: [phone]\" per referral    Spoke to the triage nurse that works with Dr Joseph and will let the provider know we don't accept transfer pts and needs to see [facility]. No further questions at this time      Josefina Whiting LPN    "

## 2023-11-07 NOTE — TELEPHONE ENCOUNTER
Spoke to Daughter who will talk to her mom about getting in touch in with [facility] to get a follow up. She will continue her hearing aid care with [facility]. I let daughter know I would reach out to her PCP about the referral.     Josefina Whiting LPN

## 2023-12-13 ENCOUNTER — PRE VISIT (OUTPATIENT)
Dept: OTOLARYNGOLOGY | Facility: CLINIC | Age: 73
End: 2023-12-13

## 2024-06-22 ENCOUNTER — HEALTH MAINTENANCE LETTER (OUTPATIENT)
Age: 74
End: 2024-06-22

## 2025-01-04 ENCOUNTER — HEALTH MAINTENANCE LETTER (OUTPATIENT)
Age: 75
End: 2025-01-04

## 2025-07-12 ENCOUNTER — HEALTH MAINTENANCE LETTER (OUTPATIENT)
Age: 75
End: 2025-07-12